# Patient Record
Sex: MALE | NOT HISPANIC OR LATINO
[De-identification: names, ages, dates, MRNs, and addresses within clinical notes are randomized per-mention and may not be internally consistent; named-entity substitution may affect disease eponyms.]

---

## 2020-01-01 ENCOUNTER — RX ONLY (OUTPATIENT)
Age: 60
Setting detail: RX ONLY
End: 2020-01-01

## 2022-02-12 ENCOUNTER — RX ONLY (RX ONLY)
Age: 62
End: 2022-02-12

## 2022-02-12 ENCOUNTER — WORRISOME GROWTH - SEE NOTE (OUTPATIENT)
Dept: URBAN - METROPOLITAN AREA CLINIC 34 | Facility: CLINIC | Age: 62
Setting detail: DERMATOLOGY
End: 2022-02-12

## 2022-02-12 DIAGNOSIS — L57.0 ACTINIC KERATOSIS: ICD-10-CM

## 2022-02-12 DIAGNOSIS — L40.0 PSORIASIS VULGARIS: ICD-10-CM

## 2022-02-12 PROBLEM — L73.8 OTHER SPECIFIED FOLLICULAR DISORDERS: Status: RESOLVED | Noted: 2022-02-12

## 2022-02-12 PROBLEM — L73.8 OTHER SPECIFIED FOLLICULAR DISORDERS: Status: ACTIVE | Noted: 2022-02-12

## 2022-02-12 PROCEDURE — 99203 OFFICE O/P NEW LOW 30 MIN: CPT

## 2022-02-12 RX ORDER — DOXYCYCLINE 100 MG/1
1 TABLET TABLET, FILM COATED ORAL TWICE A DAY
Qty: 14 | Refills: 0
Start: 2022-02-12

## 2022-02-12 RX ORDER — BETAMETHASONE DIPROPIONATE 0.5 MG/G
1 A SMALL AMOUNT CREAM, AUGMENTED TOPICAL TWICE A DAY
Qty: 50 | Refills: 0
Start: 2022-02-12

## 2022-02-25 ENCOUNTER — RX ONLY (RX ONLY)
Age: 62
End: 2022-02-25

## 2022-02-25 RX ORDER — MUPIROCIN 20 MG/G
1 A SMALL AMOUNT OINTMENT TOPICAL EVERY NIGHT
Qty: 22 | Refills: 0
Start: 2022-02-25

## 2022-02-25 RX ORDER — DOXYCYCLINE 100 MG/1
1 TABLET TABLET, FILM COATED ORAL TWICE A DAY
Qty: 14 | Refills: 0
Start: 2022-02-25

## 2022-06-08 ENCOUNTER — OFFICE VISIT (OUTPATIENT)
Dept: URBAN - METROPOLITAN AREA CLINIC 111 | Facility: CLINIC | Age: 62
End: 2022-06-08
Payer: COMMERCIAL

## 2022-06-08 ENCOUNTER — TELEPHONE ENCOUNTER (OUTPATIENT)
Dept: URBAN - METROPOLITAN AREA CLINIC 111 | Facility: CLINIC | Age: 62
End: 2022-06-08

## 2022-06-08 VITALS
HEART RATE: 69 BPM | HEIGHT: 70 IN | DIASTOLIC BLOOD PRESSURE: 72 MMHG | BODY MASS INDEX: 26.05 KG/M2 | SYSTOLIC BLOOD PRESSURE: 108 MMHG | TEMPERATURE: 98 F | WEIGHT: 182 LBS

## 2022-06-08 DIAGNOSIS — Z87.19 HISTORY OF DIVERTICULITIS: ICD-10-CM

## 2022-06-08 DIAGNOSIS — R10.32 LLQ ABDOMINAL PAIN: ICD-10-CM

## 2022-06-08 PROCEDURE — 99204 OFFICE O/P NEW MOD 45 MIN: CPT | Performed by: INTERNAL MEDICINE

## 2022-06-08 RX ORDER — DOCUSATE SODIUM 50 MG/5ML
LIQUID ORAL
Qty: 0 | Refills: 0 | Status: ACTIVE | COMMUNITY
Start: 1900-01-01

## 2022-06-08 RX ORDER — GLUCOSAMINE HCL/CHONDROITIN SU 500-400 MG
CAPSULE ORAL
Qty: 0 | Refills: 0 | Status: ACTIVE | COMMUNITY
Start: 1900-01-01

## 2022-06-08 RX ORDER — EZETIMIBE 10 MG/1
TAKE 1 TABLET (10 MG) BY ORAL ROUTE ONCE DAILY TABLET ORAL 1
Qty: 0 | Refills: 0 | Status: ACTIVE | COMMUNITY
Start: 1900-01-01

## 2022-06-08 RX ORDER — HYOSCYAMINE SULFATE 0.12 MG/1
1 TABLET AS NEEDED TABLET ORAL
Qty: 60 | Refills: 3 | OUTPATIENT
Start: 2022-06-08 | End: 2022-10-06

## 2022-06-08 NOTE — HPI-TODAY'S VISIT:
61-year-old male with history of diverticulitis presented today for evaluation of 9 months recurrent left lower quadrant abdominal pain.  The pain mostly at night or in the early morning he described as 3 out of 10 dull pain in the left lower quadrant radiated to the back associate with the diarrhea and cramping.  No blood in the stool no nausea no vomiting the pain is daily.  He was treated with the Toprol mild by the neurologist at the beginning his symptoms was worse and reducing the dose helped but she continued to have the pain.  He had colonoscopy 2019 revealed diverticulosis 2 small polyps and hemorrhoid.  No recurrent bleeding since then.  No family history of colon cancer.  No fever or chills.

## 2022-06-14 ENCOUNTER — TELEPHONE ENCOUNTER (OUTPATIENT)
Dept: URBAN - METROPOLITAN AREA CLINIC 23 | Facility: CLINIC | Age: 62
End: 2022-06-14

## 2022-06-14 ENCOUNTER — LAB OUTSIDE AN ENCOUNTER (OUTPATIENT)
Dept: URBAN - METROPOLITAN AREA CLINIC 23 | Facility: CLINIC | Age: 62
End: 2022-06-14

## 2022-06-14 LAB
CREATININE POC: 1.4
PERFORMING LAB: (no result)

## 2022-06-21 ENCOUNTER — TELEPHONE ENCOUNTER (OUTPATIENT)
Dept: URBAN - METROPOLITAN AREA CLINIC 23 | Facility: CLINIC | Age: 62
End: 2022-06-21

## 2022-10-01 ENCOUNTER — RX ONLY (OUTPATIENT)
Age: 62
Setting detail: RX ONLY
End: 2022-10-01

## 2022-10-05 ENCOUNTER — LAB OUTSIDE AN ENCOUNTER (OUTPATIENT)
Dept: URBAN - METROPOLITAN AREA CLINIC 111 | Facility: CLINIC | Age: 62
End: 2022-10-05

## 2022-10-05 ENCOUNTER — OFFICE VISIT (OUTPATIENT)
Dept: URBAN - METROPOLITAN AREA CLINIC 111 | Facility: CLINIC | Age: 62
End: 2022-10-05
Payer: COMMERCIAL

## 2022-10-05 VITALS
WEIGHT: 188 LBS | TEMPERATURE: 97.5 F | SYSTOLIC BLOOD PRESSURE: 115 MMHG | BODY MASS INDEX: 26.92 KG/M2 | DIASTOLIC BLOOD PRESSURE: 76 MMHG | HEIGHT: 70 IN | HEART RATE: 75 BPM

## 2022-10-05 DIAGNOSIS — R19.4 CHANGE IN BOWEL HABIT: ICD-10-CM

## 2022-10-05 DIAGNOSIS — R10.32 LLQ ABDOMINAL PAIN: ICD-10-CM

## 2022-10-05 DIAGNOSIS — Z87.19 HISTORY OF DIVERTICULITIS: ICD-10-CM

## 2022-10-05 DIAGNOSIS — R10.9 ABDOMINAL CRAMPING: ICD-10-CM

## 2022-10-05 DIAGNOSIS — R19.7 DIARRHEA: ICD-10-CM

## 2022-10-05 PROBLEM — 111354009: Status: ACTIVE | Noted: 2022-06-14

## 2022-10-05 PROCEDURE — 99214 OFFICE O/P EST MOD 30 MIN: CPT | Performed by: INTERNAL MEDICINE

## 2022-10-05 RX ORDER — HYOSCYAMINE SULFATE 0.12 MG/1
1 TABLET AS NEEDED TABLET ORAL
Qty: 60 | Refills: 3 | Status: ACTIVE | COMMUNITY
Start: 2022-06-08 | End: 2022-10-06

## 2022-10-05 RX ORDER — GLUCOSAMINE HCL/CHONDROITIN SU 500-400 MG
CAPSULE ORAL
Qty: 0 | Refills: 0 | Status: ACTIVE | COMMUNITY
Start: 1900-01-01

## 2022-10-05 RX ORDER — DOCUSATE SODIUM 50 MG/5ML
LIQUID ORAL
Qty: 0 | Refills: 0 | Status: ACTIVE | COMMUNITY
Start: 1900-01-01

## 2022-10-05 RX ORDER — EZETIMIBE 10 MG/1
TAKE 1 TABLET (10 MG) BY ORAL ROUTE ONCE DAILY TABLET ORAL 1
Qty: 0 | Refills: 0 | Status: ACTIVE | COMMUNITY
Start: 1900-01-01

## 2022-10-05 RX ORDER — DOCUSATE SODIUM 50 MG/5ML
LIQUID ORAL
OUTPATIENT

## 2022-10-05 NOTE — HPI-TODAY'S VISIT:
61-year-old male with history of diverticulitis presented today for follow-up for lower abdominal pain cramping mostly in the suprapubic area the pain mostly at night wake him up at night he described the pain as 4 out of 10 cramping pain.  He noted recently increased frequency of bowel movement associated with the pain.  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  He was seen by vascular surgery recommended monitoring of the dissection but was told the pain is not related to this finding.  He noted recently increased frequency of bowel movement.  He changed timing of his medication and still has the pain mostly at night.    He had colonoscopy 2019 revealed diverticulosis 2 small polyps and hemorrhoid.  No recurrent bleeding since then.  No family history of colon cancer.  No fever or chills.

## 2022-10-10 ENCOUNTER — CLAIMS CREATED FROM THE CLAIM WINDOW (OUTPATIENT)
Dept: URBAN - METROPOLITAN AREA CLINIC 4 | Facility: CLINIC | Age: 62
End: 2022-10-10
Payer: COMMERCIAL

## 2022-10-10 ENCOUNTER — OFFICE VISIT (OUTPATIENT)
Dept: URBAN - METROPOLITAN AREA SURGERY CENTER 15 | Facility: SURGERY CENTER | Age: 62
End: 2022-10-10
Payer: COMMERCIAL

## 2022-10-10 DIAGNOSIS — K52.832 LYMPHOCYTIC COLITIS: ICD-10-CM

## 2022-10-10 DIAGNOSIS — K52.832 COLITIS, UNSPEC (558.9): ICD-10-CM

## 2022-10-10 DIAGNOSIS — R19.4 ALTERATION IN BOWEL ELIMINATION: ICD-10-CM

## 2022-10-10 DIAGNOSIS — R10.33 ABDOMINAL PAIN, ACUTE, PERIUMBILICAL: ICD-10-CM

## 2022-10-10 PROCEDURE — 88342 IMHCHEM/IMCYTCHM 1ST ANTB: CPT | Performed by: PATHOLOGY

## 2022-10-10 PROCEDURE — G8907 PT DOC NO EVENTS ON DISCHARG: HCPCS | Performed by: INTERNAL MEDICINE

## 2022-10-10 PROCEDURE — 88305 TISSUE EXAM BY PATHOLOGIST: CPT | Performed by: PATHOLOGY

## 2022-10-10 PROCEDURE — 88313 SPECIAL STAINS GROUP 2: CPT | Performed by: PATHOLOGY

## 2022-10-10 PROCEDURE — 45380 COLONOSCOPY AND BIOPSY: CPT | Performed by: INTERNAL MEDICINE

## 2022-10-10 RX ORDER — GLUCOSAMINE HCL/CHONDROITIN SU 500-400 MG
CAPSULE ORAL
Qty: 0 | Refills: 0 | Status: ACTIVE | COMMUNITY
Start: 1900-01-01

## 2022-10-10 RX ORDER — EZETIMIBE 10 MG/1
TAKE 1 TABLET (10 MG) BY ORAL ROUTE ONCE DAILY TABLET ORAL 1
Qty: 0 | Refills: 0 | Status: ACTIVE | COMMUNITY
Start: 1900-01-01

## 2022-11-01 ENCOUNTER — TELEPHONE ENCOUNTER (OUTPATIENT)
Dept: URBAN - METROPOLITAN AREA CLINIC 23 | Facility: CLINIC | Age: 62
End: 2022-11-01

## 2022-11-01 RX ORDER — BUDESONIDE 3 MG/1
TAKE 3 CAPSULE CAPSULE, DELAYED RELEASE PELLETS ORAL
Qty: 105 | Refills: 2 | OUTPATIENT
Start: 2022-11-01

## 2022-12-05 ENCOUNTER — OFFICE VISIT (OUTPATIENT)
Dept: URBAN - METROPOLITAN AREA CLINIC 111 | Facility: CLINIC | Age: 62
End: 2022-12-05
Payer: COMMERCIAL

## 2022-12-05 ENCOUNTER — DASHBOARD ENCOUNTERS (OUTPATIENT)
Age: 62
End: 2022-12-05

## 2022-12-05 VITALS
HEIGHT: 70 IN | SYSTOLIC BLOOD PRESSURE: 119 MMHG | DIASTOLIC BLOOD PRESSURE: 76 MMHG | TEMPERATURE: 97.7 F | HEART RATE: 97 BPM | BODY MASS INDEX: 26.92 KG/M2 | WEIGHT: 188 LBS

## 2022-12-05 DIAGNOSIS — R10.9 ABDOMINAL CRAMPING: ICD-10-CM

## 2022-12-05 DIAGNOSIS — K52.832 LYMPHOCYTIC COLITIS: ICD-10-CM

## 2022-12-05 PROBLEM — 1187071008: Status: ACTIVE | Noted: 2022-12-05

## 2022-12-05 PROCEDURE — 99212 OFFICE O/P EST SF 10 MIN: CPT | Performed by: NURSE PRACTITIONER

## 2022-12-05 RX ORDER — BUDESONIDE 3 MG/1
TAKE 3 CAPSULE CAPSULE, DELAYED RELEASE PELLETS ORAL
Qty: 105 | Refills: 2 | Status: ACTIVE | COMMUNITY
Start: 2022-11-01

## 2022-12-05 RX ORDER — HYOSCYAMINE SULFATE 0.12 MG/1
1 TABLET UNDER THE TONGUE AND ALLOW TO DISSOLVE  AS NEEDED TABLET, ORALLY DISINTEGRATING ORAL THREE TIMES A DAY
Qty: 30 | Refills: 1 | OUTPATIENT
Start: 2022-12-05 | End: 2022-12-25

## 2022-12-05 RX ORDER — EZETIMIBE 10 MG/1
TAKE 1 TABLET (10 MG) BY ORAL ROUTE ONCE DAILY TABLET ORAL 1
Qty: 0 | Refills: 0 | Status: ACTIVE | COMMUNITY
Start: 1900-01-01

## 2022-12-05 RX ORDER — GLUCOSAMINE HCL/CHONDROITIN SU 500-400 MG
CAPSULE ORAL
Qty: 0 | Refills: 0 | Status: ACTIVE | COMMUNITY
Start: 1900-01-01

## 2022-12-05 NOTE — HPI-TODAY'S VISIT:
61 yo male presents for follow up. Patient was last seen by Dr. Hansen for bowel habit change and low abd cramping since last year that worsened last few months. Colonoscopy on 10/10/22 revealed diverticulosis, hemorrhoids and lymphocytic colitis. Pt started budesonide few weeks ago tapering down now. Reports low abd cramping especially at night. Has a lot of gas. Has alternating constipation and loose. He hasn't feeling better after starting BUdesonide. Tried dicyclomin without relief. Take ibuprofen occasionally for migraine. Denies fever, chills, abd pain, blood in stool or weight loss.

## 2023-06-01 ENCOUNTER — RX ONLY (OUTPATIENT)
Age: 63
Setting detail: RX ONLY
End: 2023-06-01

## 2023-06-09 ENCOUNTER — OUT OF OFFICE VISIT (OUTPATIENT)
Dept: URBAN - METROPOLITAN AREA MEDICAL CENTER 27 | Facility: MEDICAL CENTER | Age: 63
End: 2023-06-09
Payer: COMMERCIAL

## 2023-06-09 DIAGNOSIS — K29.60 ADENOPAPILLOMATOSIS GASTRICA: ICD-10-CM

## 2023-06-09 DIAGNOSIS — R19.7 ACUTE DIARRHEA: ICD-10-CM

## 2023-06-09 DIAGNOSIS — K85.80 ACUTE VIRAL PANCREATITIS: ICD-10-CM

## 2023-06-09 PROCEDURE — 99221 1ST HOSP IP/OBS SF/LOW 40: CPT | Performed by: INTERNAL MEDICINE

## 2023-06-09 PROCEDURE — G8427 DOCREV CUR MEDS BY ELIG CLIN: HCPCS | Performed by: INTERNAL MEDICINE

## 2023-06-09 PROCEDURE — 99232 SBSQ HOSP IP/OBS MODERATE 35: CPT | Performed by: INTERNAL MEDICINE

## 2023-06-19 ENCOUNTER — OUT OF OFFICE VISIT (OUTPATIENT)
Dept: URBAN - METROPOLITAN AREA MEDICAL CENTER 27 | Facility: MEDICAL CENTER | Age: 63
End: 2023-06-19
Payer: COMMERCIAL

## 2023-06-19 DIAGNOSIS — R79.89 ABNORMAL BILIRUBIN TEST: ICD-10-CM

## 2023-06-19 DIAGNOSIS — K85.80 ACUTE VIRAL PANCREATITIS: ICD-10-CM

## 2023-06-19 DIAGNOSIS — R74.01 ABNORMAL/ELEVATED TRANSAMINASE (SGOT, AMINOTRANSFERASE): ICD-10-CM

## 2023-06-19 DIAGNOSIS — K82.8 ADENOMYOSIS OF GALLBLADDER: ICD-10-CM

## 2023-06-19 PROCEDURE — 99232 SBSQ HOSP IP/OBS MODERATE 35: CPT | Performed by: INTERNAL MEDICINE

## 2023-06-20 ENCOUNTER — LAB OUTSIDE AN ENCOUNTER (OUTPATIENT)
Dept: URBAN - METROPOLITAN AREA CLINIC 23 | Facility: CLINIC | Age: 63
End: 2023-06-20

## 2024-02-01 ENCOUNTER — APPOINTMENT (RX ONLY)
Dept: URBAN - METROPOLITAN AREA CLINIC 159 | Facility: CLINIC | Age: 64
Setting detail: DERMATOLOGY
End: 2024-02-01

## 2024-02-01 DIAGNOSIS — L01.01 NON-BULLOUS IMPETIGO: ICD-10-CM | Status: WORSENING

## 2024-02-01 DIAGNOSIS — L24 IRRITANT CONTACT DERMATITIS: ICD-10-CM | Status: WORSENING

## 2024-02-01 PROBLEM — L24.9 IRRITANT CONTACT DERMATITIS, UNSPECIFIED CAUSE: Status: ACTIVE | Noted: 2024-02-01

## 2024-02-01 PROCEDURE — ? COUNSELING

## 2024-02-01 PROCEDURE — 99203 OFFICE O/P NEW LOW 30 MIN: CPT

## 2024-02-01 PROCEDURE — ? PRESCRIPTION

## 2024-02-01 RX ORDER — DOXYCYCLINE HYCLATE 100 MG/1
CAPSULE, GELATIN COATED ORAL
Qty: 20 | Refills: 0 | Status: ERX | COMMUNITY
Start: 2024-02-01

## 2024-02-01 RX ORDER — BETAMETHASONE DIPROPIONATE 0.5 MG/G
CREAM TOPICAL
Qty: 45 | Refills: 0 | Status: ERX | COMMUNITY
Start: 2024-02-01

## 2024-02-01 RX ADMIN — BETAMETHASONE DIPROPIONATE: 0.5 CREAM TOPICAL at 00:00

## 2024-02-01 RX ADMIN — DOXYCYCLINE HYCLATE: 100 CAPSULE, GELATIN COATED ORAL at 00:00

## 2024-02-01 ASSESSMENT — LOCATION ZONE DERM: LOCATION ZONE: NOSE

## 2024-02-01 ASSESSMENT — LOCATION DETAILED DESCRIPTION DERM: LOCATION DETAILED: NASAL INFRATIP

## 2024-02-01 ASSESSMENT — LOCATION SIMPLE DESCRIPTION DERM: LOCATION SIMPLE: NOSE

## 2024-02-01 NOTE — PROCEDURE: COUNSELING
Detail Level: Detailed
Patient Specific Counseling (Will Not Stick From Patient To Patient): Recommend use of gentle cleanser such as liquid dove. Recommend use of gentle laundry detergent. Advised to avoid scented products on skin and aerosolized in house.
Cleanser Recommendations: Liquid Dove Cleanser
Detail Level: Zone

## 2024-02-01 NOTE — HPI: RASH
Is This A New Presentation, Or A Follow-Up?: Rash
How Severe Is Your Rash?: mild
Additional History: Had something similar on back but has cleared, has not used anything on it

## 2024-08-26 ENCOUNTER — APPOINTMENT (RX ONLY)
Dept: URBAN - METROPOLITAN AREA CLINIC 159 | Facility: CLINIC | Age: 64
Setting detail: DERMATOLOGY
End: 2024-08-26

## 2024-08-26 DIAGNOSIS — L663 OTHER SPECIFIED DISEASES OF HAIR AND HAIR FOLLICLES: ICD-10-CM | Status: INADEQUATELY CONTROLLED

## 2024-08-26 DIAGNOSIS — L738 OTHER SPECIFIED DISEASES OF HAIR AND HAIR FOLLICLES: ICD-10-CM | Status: INADEQUATELY CONTROLLED

## 2024-08-26 DIAGNOSIS — L73.9 FOLLICULAR DISORDER, UNSPECIFIED: ICD-10-CM | Status: INADEQUATELY CONTROLLED

## 2024-08-26 PROBLEM — L02.421 FURUNCLE OF RIGHT AXILLA: Status: ACTIVE | Noted: 2024-08-26

## 2024-08-26 PROCEDURE — 99213 OFFICE O/P EST LOW 20 MIN: CPT

## 2024-08-26 PROCEDURE — ? PRESCRIPTION

## 2024-08-26 PROCEDURE — ? COUNSELING

## 2024-08-26 RX ORDER — CLINDAMYCIN PHOSPHATE 10 MG/G
GEL TOPICAL
Qty: 60 | Refills: 3 | Status: ERX | COMMUNITY
Start: 2024-08-26

## 2024-08-26 RX ORDER — CLINDAMYCIN PHOSPHATE 10 MG/ML
LOTION TOPICAL
Qty: 60 | Refills: 4 | Status: ERX | COMMUNITY
Start: 2024-08-26

## 2024-08-26 RX ADMIN — CLINDAMYCIN PHOSPHATE: 10 LOTION TOPICAL at 00:00

## 2024-08-26 RX ADMIN — CLINDAMYCIN PHOSPHATE: 10 GEL TOPICAL at 00:00

## 2024-08-26 ASSESSMENT — LOCATION DETAILED DESCRIPTION DERM: LOCATION DETAILED: RIGHT AXILLARY VAULT

## 2024-08-26 ASSESSMENT — LOCATION SIMPLE DESCRIPTION DERM: LOCATION SIMPLE: RIGHT AXILLARY VAULT

## 2024-08-26 ASSESSMENT — LOCATION ZONE DERM: LOCATION ZONE: AXILLAE

## 2024-08-26 NOTE — HPI: SKIN LESION
Is This A New Presentation, Or A Follow-Up?: Growths
Has Your Skin Lesion Been Treated?: not been treated
What Type Of Note Output Would You Prefer (Optional)?: Standard Output
Additional History: Patient states the area is not tender/inflamed at this time but does have a HX of drainage and inflammation. Patient is interested in having these growths removed.\\n\\n
For information on Fall & Injury Prevention, visit: [email or web address] OR  [email or web address] OR  https://www.cdc.gov/steadi/patient.html